# Patient Record
Sex: MALE | Race: WHITE | NOT HISPANIC OR LATINO | Employment: FULL TIME | URBAN - METROPOLITAN AREA
[De-identification: names, ages, dates, MRNs, and addresses within clinical notes are randomized per-mention and may not be internally consistent; named-entity substitution may affect disease eponyms.]

---

## 2018-01-25 ENCOUNTER — PATIENT MESSAGE (OUTPATIENT)
Dept: NEUROLOGY | Facility: CLINIC | Age: 23
End: 2018-01-25

## 2018-02-28 ENCOUNTER — OFFICE VISIT (OUTPATIENT)
Dept: NEUROLOGY | Facility: CLINIC | Age: 23
End: 2018-02-28
Payer: COMMERCIAL

## 2018-02-28 ENCOUNTER — TELEPHONE (OUTPATIENT)
Dept: NEUROLOGY | Facility: CLINIC | Age: 23
End: 2018-02-28

## 2018-02-28 VITALS
SYSTOLIC BLOOD PRESSURE: 139 MMHG | WEIGHT: 218.81 LBS | RESPIRATION RATE: 18 BRPM | HEIGHT: 70 IN | BODY MASS INDEX: 31.32 KG/M2 | HEART RATE: 75 BPM | DIASTOLIC BLOOD PRESSURE: 83 MMHG

## 2018-02-28 DIAGNOSIS — Z87.820 HISTORY OF MULTIPLE CONCUSSIONS: ICD-10-CM

## 2018-02-28 DIAGNOSIS — R55 VASOVAGAL SYNCOPE: Primary | ICD-10-CM

## 2018-02-28 PROCEDURE — 99205 OFFICE O/P NEW HI 60 MIN: CPT | Mod: S$GLB,,, | Performed by: PSYCHIATRY & NEUROLOGY

## 2018-02-28 PROCEDURE — 99999 PR PBB SHADOW E&M-EST. PATIENT-LVL III: CPT | Mod: PBBFAC,,, | Performed by: PSYCHIATRY & NEUROLOGY

## 2018-02-28 NOTE — PROGRESS NOTES
Subjective:      3/2/2018       Patient ID: Kota Finn is a right handed 22 y.o.  male who presents for multiple complaints including HA, dizziness, concussion    History of Present Illness    In discussion with the patient the main issue seems to be related to pre-syncopal spells.     Reviewed records in Epic; hx of concussions from playing football.      Oct 2015 - was at home, father had gotten cut and was bleeding.  He intially thought he was not hurt (father fell out of a chair) but went to check on him, was bleeding everywhere and at the sight of blood passed out - hit corner of bedframe on his head.  Amnestic for about an hour afterwards, repeating questions, was disoriented, no N/V he can recall.  Back to baseline in about 2 hours.     + prior concussion from football.  No hx of seizure or meningitis.     Oct 17 - was in class at school - was watching an educational film about first aid, and again at the sight of blood (of note he is an avid elle, and has never had issues with this while hunting) felt warm, gooseflesh, diaphoretic, felt dizzy and lightheaded. Rapid palpitations.  May have some visual changes (dimming, going dark, blurring), no alteration in hearing. Lasted 20 minutes or so, passed.      Since then, has had similar sensation 1-2 times a month, may occur when is different classes, also outside of school; may have similar sx as above, may also feel nauseated.     Review of patient's allergies indicates:  No Known Allergies  No current outpatient prescriptions on file.     No current facility-administered medications for this visit.        Past Medical History  Past Medical History:   Diagnosis Date    H/O multiple concussions     Spleen injury        Past Surgical History  History reviewed. No pertinent surgical history.    Family History  Family History   Problem Relation Age of Onset    Hypoglycemic Father     Metabolic syndrome Sister     Diabetes Maternal Grandmother         Social History  Social History     Social History    Marital status: Single     Spouse name: N/A    Number of children: N/A    Years of education: N/A     Occupational History    Not on file.     Social History Main Topics    Smoking status: Never Smoker    Smokeless tobacco: Never Used    Alcohol use No    Drug use: Unknown    Sexual activity: Not on file     Other Topics Concern    Not on file     Social History Narrative    No narrative on file        Review of Systems  Review of Systems   Constitutional: Negative for appetite change, fatigue, fever and unexpected weight change.   HENT: Negative for congestion, hearing loss, nosebleeds, sinus pressure and trouble swallowing.    Eyes: Negative for pain and visual disturbance.   Respiratory: Negative for cough, shortness of breath and wheezing.    Cardiovascular: Negative for chest pain and palpitations.   Gastrointestinal: Negative for abdominal pain, blood in stool, diarrhea, nausea and vomiting.   Endocrine: Negative for cold intolerance and heat intolerance.   Genitourinary: Negative for difficulty urinating, hematuria and urgency.   Musculoskeletal: Negative for arthralgias, back pain, gait problem, myalgias and neck pain.   Skin: Negative for rash and wound.   Neurological: Positive for weakness. Negative for dizziness, seizures and numbness.   Hematological: Bruises/bleeds easily.   Psychiatric/Behavioral: Positive for dysphoric mood and self-injury. Negative for decreased concentration and sleep disturbance.       Objective:        Vitals:    02/28/18 0930   BP: 139/83   Pulse: 75   Resp: 18     Body mass index is 31.39 kg/m².  Neurologic Exam     Mental Status   Oriented to person, place, and time.   Attention: normal. Concentration: normal.   Speech: speech is normal   Level of consciousness: alert  Knowledge: good.   Able to name object. Able to repeat. Normal comprehension.     Cranial Nerves   Cranial nerves II through XII intact.      CN II   Visual fields full to confrontation.     CN III, IV, VI   Pupils are equal, round, and reactive to light.  Extraocular motions are normal.   Right pupil: Shape: regular.   Left pupil: Shape: regular.   CN III: no CN III palsy  CN VI: no CN VI palsy  Nystagmus: none   Ophthalmoparesis: none    CN V   Facial sensation intact.     CN VII   Facial expression full, symmetric.     CN VIII   CN VIII normal.   Hearing: intact    CN IX, X   CN IX normal.   Palate: symmetric    CN XI   CN XI normal.     CN XII   CN XII normal.   Normal HINTS exam    Normal fundoscopic exam     Motor Exam   Muscle bulk: normal  Right arm pronator drift: absent  Left arm pronator drift: absent    Strength   Strength 5/5 throughout.     Sensory Exam   Light touch normal.   Vibration normal.   Proprioception normal.   Pinprick normal.     Gait, Coordination, and Reflexes     Gait  Gait: normal    Coordination   Romberg: negative  Finger to nose coordination: normal  Tandem walking coordination: normal    Tremor   Resting tremor: absent  Action tremor: absent    Reflexes   Right brachioradialis: 2+  Left brachioradialis: 2+  Right biceps: 2+  Left biceps: 2+  Right triceps: 2+  Left triceps: 2+  Right patellar: 3+  Left patellar: 3+  Right achilles: 2+  Left achilles: 2+  Right plantar: normal  Left plantar: normal      Physical Exam   Constitutional: He is oriented to person, place, and time. He appears well-developed and well-nourished.   HENT:   Head: Normocephalic and atraumatic.   Eyes: EOM are normal. Pupils are equal, round, and reactive to light.   Cardiovascular: Normal rate and regular rhythm.    Pulmonary/Chest: Effort normal and breath sounds normal.   Neurological: He is oriented to person, place, and time. He has normal strength. He has a normal Finger-Nose-Finger Test, a normal Romberg Test and a normal Tandem Gait Test. Gait normal.   Reflex Scores:       Tricep reflexes are 2+ on the right side and 2+ on the left  side.       Bicep reflexes are 2+ on the right side and 2+ on the left side.       Brachioradialis reflexes are 2+ on the right side and 2+ on the left side.       Patellar reflexes are 3+ on the right side and 3+ on the left side.       Achilles reflexes are 2+ on the right side and 2+ on the left side.  Psychiatric: He has a normal mood and affect. His speech is normal and behavior is normal. Judgment and thought content normal.   Vitals reviewed.        Data Review:     Results for RAFAEL CASAREZ (MRN [id number]) as of 2/28/2018 09:50   Ref. Range 9/13/2017 15:42   WBC Latest Ref Range: 3.90 - 12.70 K/uL 9.01   RBC Latest Ref Range: 4.60 - 6.20 M/uL 5.06   Hemoglobin Latest Ref Range: 14.0 - 18.0 g/dL 16.2   Hematocrit Latest Ref Range: 40.0 - 54.0 % 46.9   MCV Latest Ref Range: 82 - 98 fL 93   MCH Latest Ref Range: 27.0 - 31.0 pg 32.0 (H)   MCHC Latest Ref Range: 32.0 - 36.0 g/dL 34.5   RDW Latest Ref Range: 11.5 - 14.5 % 11.4 (L)   Platelets Latest Ref Range: 150 - 350 K/uL 257   MPV Latest Ref Range: 9.2 - 12.9 fL 9.9   Gran% Latest Ref Range: 38.0 - 73.0 % 60.4   Gran # (ANC) Latest Ref Range: 1.8 - 7.7 K/uL 5.4   Lymph% Latest Ref Range: 18.0 - 48.0 % 27.1   Lymph # Latest Ref Range: 1.0 - 4.8 K/uL 2.4   Mono% Latest Ref Range: 4.0 - 15.0 % 7.9   Mono # Latest Ref Range: 0.3 - 1.0 K/uL 0.7   Eosinophil% Latest Ref Range: 0.0 - 8.0 % 3.6   Eos # Latest Ref Range: 0.0 - 0.5 K/uL 0.3   Basophil% Latest Ref Range: 0.0 - 1.9 % 1.0   Baso # Latest Ref Range: 0.00 - 0.20 K/uL 0.09   nRBC Latest Ref Range: 0 /100 WBC 0   Sodium Latest Ref Range: 136 - 145 mmol/L 139   Potassium Latest Ref Range: 3.5 - 5.1 mmol/L 4.3   Chloride Latest Ref Range: 95 - 110 mmol/L 101   CO2 Latest Ref Range: 22 - 31 mmol/L 26   Anion Gap Latest Ref Range: 8 - 16 mmol/L 12   BUN, Bld Latest Ref Range: 9 - 21 mg/dL 18   Creatinine Latest Ref Range: 0.50 - 1.40 mg/dL 1.14   eGFR if non African American Latest Ref Range: >60 mL/min/1.73  m^2 >60   eGFR if  Latest Ref Range: >60 mL/min/1.73 m^2 >60   Glucose Latest Ref Range: 70 - 110 mg/dL 96   Calcium Latest Ref Range: 8.4 - 10.2 mg/dL 10.1   Alkaline Phosphatase Latest Ref Range: 38 - 145 U/L 94   Total Protein Latest Ref Range: 6.0 - 8.4 g/dL 8.3   Albumin Latest Ref Range: 3.5 - 5.2 g/dL 4.8   Total Bilirubin Latest Ref Range: 0.2 - 1.3 mg/dL 0.6   AST Latest Ref Range: 17 - 59 U/L 20   ALT Latest Ref Range: 10 - 44 U/L 36   TSH Latest Ref Range: 0.400 - 4.000 uIU/mL 2.960     Assessment:       1. Vasovagal syncope    2. History of multiple concussions        Plan:         Problem List Items Addressed This Visit        Neuro    History of multiple concussions       Other    Vasovagal syncope - Primary    Current Assessment & Plan     Classic clinical presentation for vasovagal syncope.  Reviewed diagnosis, clinical features, and treatment options with the patient and his wife.  Will confirm with tilt-table study. Unclear if any relationship to his prior concussions; concern is more with risk of repeated head injury from syncope and falls.  Would consider routine follow up with concussion clinic for monitoring.          Relevant Orders    Tilt table          Follow-up in about 3 months (around 5/28/2018).        Patient information shared at the time of visit:      Thank you very much for the opportunity to assist in this patient's care.  If you have any questions or concerns, please do not hesitate to contact me at any time.     Sincerely,  Krzysztof Lowe, DO

## 2018-03-02 NOTE — ASSESSMENT & PLAN NOTE
Classic clinical presentation for vasovagal syncope.  Reviewed diagnosis, clinical features, and treatment options with the patient and his wife.  Will confirm with tilt-table study. Unclear if any relationship to his prior concussions; concern is more with risk of repeated head injury from syncope and falls.  Would consider routine follow up with concussion clinic for monitoring.     This is not something typically managed in neurology clinic; discussed countermeasure maneuvers, driving safety, and avoidance of syncope and falls.  Will see him back to review tilt table study, however for further treatment will likely need cardiology to manage.

## 2018-03-23 ENCOUNTER — TELEPHONE (OUTPATIENT)
Dept: NEUROLOGY | Facility: CLINIC | Age: 23
End: 2018-03-23

## 2018-03-23 ENCOUNTER — TELEPHONE (OUTPATIENT)
Dept: ELECTROPHYSIOLOGY | Facility: CLINIC | Age: 23
End: 2018-03-23

## 2018-03-23 ENCOUNTER — PATIENT MESSAGE (OUTPATIENT)
Dept: ELECTROPHYSIOLOGY | Facility: CLINIC | Age: 23
End: 2018-03-23

## 2018-03-23 NOTE — PROGRESS NOTES
TILT TABLE TEST EDUCATION CHECKLIST    4-09-18 @ 8 AM  - TILT TABLE  REPORT TO CARDIOLOGY WAITING ROOM ON 3RD FLOOR OF HOSPITAL  (DO NOT REPORT TO CLINIC)  Directions for Reporting to Cardiology Waiting Area in the Hospital  If you park in the Parking Garage:  Take elevators to the 2nd floor  Walk up ramp and turn right by [address] Elev[address]  Take elevator to the 3rd floor  Upon exiting the elevator, turn away from the clinic areas  Walk long hackett around to front of hospital to area with windows overlooking [facility]  Check in at Reception Desk  OR  If family is dropping you off:  Have them drop you off at the front of the Hospital  (Near the ER, where all the flags are hung).  Take the E elevators to the 3rd floor.  Check in at the Reception Desk in the waiting room.        DO NOT EAT OR DRINK ANYTHING AFTER MIDNIGHT ON THE NIGHT BEFORE YOUR TEST    YOU SHOULD TAKE YOUR USUAL MORNING MEDICATIONS WITH A SIP OF WATER    YOU WILL NEED SOMEONE TO DRIVE YOU HOME AFTER YOUR TEST    THE ABOVE INSTRUCTIONS WERE GIVEN TO THE PATIENT VERBALLY AND THEY VERBALIZED UNDERSTANDING. THEY DO NOT REQUIRE ANY SPECIAL NEEDS AND DO NOT HAVE ANY LEARNING BARRIERS.     Any need to reschedule or cancel procedures, or any questions regarding your procedures should be addressed directly with the Arrhythmia Department Nurses at the following phone number: [phone]

## 2018-03-23 NOTE — TELEPHONE ENCOUNTER
Returned call to wife and scheduled tilt.    ----- Message from Angeles Najera MA sent at 3/23/2018  2:52 PM CDT -----  Contact: wife Jackie [phone]   Pt. 's wife is calling to schedule a Tilt Table Test. Please call Jackie. Ref. Dr.Daniel Lowe. Orders are in Epic

## 2018-03-23 NOTE — TELEPHONE ENCOUNTER
----- Message from Haydee Grover sent at 3/23/2018  1:49 PM CDT -----  Contact: mother, Jackie Finn  Patient's mother, Jackie Tolentinolon is calling regarding scheduling the tilt table test for patient. Please call mother at [phone]. Thanks!

## 2018-04-09 ENCOUNTER — HOSPITAL ENCOUNTER (OUTPATIENT)
Facility: HOSPITAL | Age: 23
Discharge: HOME | End: 2018-04-09
Attending: INTERNAL MEDICINE | Admitting: INTERNAL MEDICINE
Payer: COMMERCIAL

## 2018-04-09 ENCOUNTER — SURGERY (OUTPATIENT)
Age: 23
End: 2018-04-09

## 2018-04-09 ENCOUNTER — HOSPITAL ENCOUNTER (OUTPATIENT)
Dept: NEUROLOGY | Facility: CLINIC | Age: 23
Discharge: HOME | End: 2018-04-09
Payer: COMMERCIAL

## 2018-04-09 VITALS — HEIGHT: 69 IN | BODY MASS INDEX: 31.84 KG/M2 | WEIGHT: 215 LBS

## 2018-04-09 DIAGNOSIS — R55 VASOVAGAL SYNCOPE: Primary | ICD-10-CM

## 2018-04-09 PROCEDURE — 93660 TILT TABLE EVALUATION: CPT | Mod: 26,,, | Performed by: INTERNAL MEDICINE

## 2018-04-09 PROCEDURE — 93660 TILT TABLE EVALUATION: CPT

## 2018-04-09 PROCEDURE — 95813 EEG EXTND MNTR 61-119 MIN: CPT | Mod: S$GLB,,, | Performed by: PSYCHIATRY & NEUROLOGY

## 2018-04-09 PROCEDURE — 95813 PR EEG, EXTENDED, 61-119 MINS: ICD-10-PCS | Mod: S$GLB,,, | Performed by: PSYCHIATRY & NEUROLOGY

## 2018-04-09 RX ORDER — SODIUM CHLORIDE 9 MG/ML
INJECTION, SOLUTION INTRAVENOUS CONTINUOUS
Status: DISCONTINUED | OUTPATIENT
Start: 2018-04-09 | End: 2018-04-09 | Stop reason: HOSPADM

## 2018-04-09 NOTE — PROCEDURES
EXTENDED  ELECTROENCEPHALOGRAM  REPORT - with TILT TABLE TESTING    Kota Finn  [id number]  1995    DATE OF SERVICE: 4/9/18    DATE OF ADMISSION: 4/9/2018  7:30 AM    ADMITTING/REQUESTING PROVIDER:  Krzysztof Lowe DO    REASON FOR CONSULT: 21yo M with hx of multiple concussions and pre-syncope.    MEDICATIONS:   No current outpatient prescriptions on file.     No current facility-administered medications for this encounter.      METHODOLOGY   Electroencephalographic (EEG) recording is with electrodes placed according to the International 10-20 placement system.  Thirty two (32) channels of digital signal (sampling rate of 512/sec) including T1 and T2 was simultaneously recorded from the scalp and may include  EKG, EMG, and/or eye monitors.  Recording band pass was 0.1 to 512 hz.  Digital video recording of the patient is simultaneously recorded with the EEG.  The patient is instructed report clinical symptoms which may occur during the recording session.  EEG and video recording is stored and archived in digital format.  Activation procedures which include photic stimulation, hyperventilation and instructing patients to perform simple task are done in selected patients.   The EEG is displayed on a monitor screen and can be reviewed using different montages.  Computer assisted analysis is employed to detect spike and electrographic seizure activity.   The entire record is submitted for computer analysis.  The entire recording is visually reviewed and the times identified by computer analysis as being spikes or seizures are reviewed again.  Compresses spectral analysis (CSA) is also performed on the activity recorded from each individual channel.  This is displayed as a power display of frequencies from 0 to 30 Hz over time.   The CSA is reviewed looking for asymmetries in power between homologous areas of the scalp and then compared with the original EEG recording.     Diabeto software was also utilized  in the review of this study.  This software suite analyzes the EEG recording in multiple domains.  Coherence and rhythmicity is computed to identify EEG sections which may contain organized seizures.  Each channel undergoes analysis to detect presence of spike and sharp waves which have special and morphological characteristic of epileptic activity.  The routine EEG recording is converted from spacial into frequency domain.  This is then displayed comparing homologous areas to identify areas of significant asymmetry.  Algorithm to identify non-cortically generated artifact is used to separate eye movement, EMG and other artifact from the EEG.      RECORDING TIMES  Start on 4/9/18, 09:16  Stop on 4/9/18, 10:20    A total of 1 hour and 4 minutes of EEG recording was obtained.    EEG FINGINGS  Background activity:   The background rhythm was characterized by theta-alpha (7-10 Hz) activity with a 9 Hz posterior dominant alpha rhythm at 30-70 microvolts.   Symmetry and continuity: the background was continuous and symmetric    Sleep:    Not seen.    Tilt-testing:  Clinical: The patient became symptomatic when he was in the upright tilt-table position. However, as the tech was replacing an EEG electrode and the study was paused, the onset was not captured.  At 09:48:54 he is noted to turn his head to the left, briefly open his eyes, lean forward with clonicmovement of his left UE associated with coughing. He is also noted to have momentary tonic followed by clonic type of activity mainly of trunk and left UE by which time the bed was lowered to 30 degree position.    He is noted to respond appropriately at 09:49:24 while in the supine position.    EEG: The study was normal when it was paused at 09:48:00 for replacing electrode. When the study was resumed at 09:48:50 high amplitude symmetric delta (2 Hz) activity was noted for 4 seconds, followed by 5 seconds of 1 Hz delta activity and a further 2 seconds of relative  suppression, followed by another 3 seconds of 2-3 Hz delta activity and then mixture of theta-delta activity, until return to baseline alpha at 09:49:10     EKG: was not interpretable during 09:48:50 (unclear onset due to break) and 09:48:54 followed by initial bradycardia for 2-3 seconds and return to sinus rhythm at 09:48:59    Abnormal activity:   No epileptiform discharges, periodic discharges, lateralized rhythmic delta activity or electrographic seizures were seen.    IMPRESSION:   This is an abnormal tilt-table test done with EEG, due to the event recorded: possible asystole/bradycardia on EKG, associated with generalized slowing and associated clinical loss of awareness.    The EEG was normal for the rest of the study.    CLINICAL CORRELATION IS RECOMMENDED    Rossana Hernandez MD, ARASH(), SHANNAN DEXTER.  Neurology-Epilepsy.  [facility].

## 2018-04-19 ENCOUNTER — PATIENT MESSAGE (OUTPATIENT)
Dept: ELECTROPHYSIOLOGY | Facility: CLINIC | Age: 23
End: 2018-04-19

## 2018-04-20 ENCOUNTER — PATIENT MESSAGE (OUTPATIENT)
Dept: ELECTROPHYSIOLOGY | Facility: CLINIC | Age: 23
End: 2018-04-20

## 2018-04-20 NOTE — TELEPHONE ENCOUNTER
The tilt table study was very positive - you did pass out, had some shaking (which will occur with loss of consciousness) heart rate became slow and there was a 6 second pause, and blood pressure dropped to a very low level.      This is clearly abnormal, and the treatment will need to  be arranged through cardiologist and Dr. Reyes.  Good news it is not due to a primary neurological problem.      Until you can get this under control you should not be driving - don't want this to happen when you are on the road.  Will send this to Dr. Reyes's staff as well to arrange next steps

## 2018-04-20 NOTE — TELEPHONE ENCOUNTER
Spoke with patient's wife.  She states that they can't make it for 11:15    Per Dr Reyes, ok to schedule for 1:15    Scheduled appointment for 1:15

## 2018-06-04 ENCOUNTER — TELEPHONE (OUTPATIENT)
Dept: NEUROLOGY | Facility: CLINIC | Age: 23
End: 2018-06-04